# Patient Record
Sex: MALE | Race: OTHER | HISPANIC OR LATINO | URBAN - METROPOLITAN AREA
[De-identification: names, ages, dates, MRNs, and addresses within clinical notes are randomized per-mention and may not be internally consistent; named-entity substitution may affect disease eponyms.]

---

## 2018-01-21 ENCOUNTER — EMERGENCY (EMERGENCY)
Facility: HOSPITAL | Age: 43
LOS: 0 days | Discharge: HOME | End: 2018-01-21
Attending: EMERGENCY MEDICINE
Payer: SELF-PAY

## 2018-01-21 VITALS
SYSTOLIC BLOOD PRESSURE: 129 MMHG | HEART RATE: 74 BPM | DIASTOLIC BLOOD PRESSURE: 97 MMHG | TEMPERATURE: 99 F | RESPIRATION RATE: 18 BRPM | OXYGEN SATURATION: 99 %

## 2018-01-21 VITALS
RESPIRATION RATE: 18 BRPM | HEART RATE: 78 BPM | TEMPERATURE: 98 F | HEIGHT: 72 IN | DIASTOLIC BLOOD PRESSURE: 104 MMHG | OXYGEN SATURATION: 100 % | WEIGHT: 240.08 LBS | SYSTOLIC BLOOD PRESSURE: 158 MMHG

## 2018-01-21 DIAGNOSIS — N13.2 HYDRONEPHROSIS WITH RENAL AND URETERAL CALCULOUS OBSTRUCTION: ICD-10-CM

## 2018-01-21 DIAGNOSIS — R11.0 NAUSEA: ICD-10-CM

## 2018-01-21 DIAGNOSIS — R10.9 UNSPECIFIED ABDOMINAL PAIN: ICD-10-CM

## 2018-01-21 LAB
ALBUMIN SERPL ELPH-MCNC: 4 G/DL — SIGNIFICANT CHANGE UP (ref 3.3–5)
ALP SERPL-CCNC: 85 U/L — SIGNIFICANT CHANGE UP (ref 40–120)
ALT FLD-CCNC: 59 U/L — SIGNIFICANT CHANGE UP (ref 12–78)
ANION GAP SERPL CALC-SCNC: 10 MMOL/L — SIGNIFICANT CHANGE UP (ref 5–17)
APPEARANCE UR: CLEAR — SIGNIFICANT CHANGE UP
AST SERPL-CCNC: 30 U/L — SIGNIFICANT CHANGE UP (ref 15–37)
BASOPHILS # BLD AUTO: 0.1 K/UL — SIGNIFICANT CHANGE UP (ref 0–0.2)
BASOPHILS NFR BLD AUTO: 1 % — SIGNIFICANT CHANGE UP (ref 0–2)
BILIRUB SERPL-MCNC: 0.3 MG/DL — SIGNIFICANT CHANGE UP (ref 0.2–1.2)
BILIRUB UR-MCNC: NEGATIVE — SIGNIFICANT CHANGE UP
BUN SERPL-MCNC: 17 MG/DL — SIGNIFICANT CHANGE UP (ref 7–23)
CALCIUM SERPL-MCNC: 8.5 MG/DL — SIGNIFICANT CHANGE UP (ref 8.5–10.1)
CHLORIDE SERPL-SCNC: 108 MMOL/L — SIGNIFICANT CHANGE UP (ref 96–108)
CO2 SERPL-SCNC: 24 MMOL/L — SIGNIFICANT CHANGE UP (ref 22–31)
COLOR SPEC: YELLOW — SIGNIFICANT CHANGE UP
CREAT SERPL-MCNC: 1.11 MG/DL — SIGNIFICANT CHANGE UP (ref 0.5–1.3)
DIFF PNL FLD: NEGATIVE — SIGNIFICANT CHANGE UP
EOSINOPHIL # BLD AUTO: 0.2 K/UL — SIGNIFICANT CHANGE UP (ref 0–0.5)
EOSINOPHIL NFR BLD AUTO: 1.8 % — SIGNIFICANT CHANGE UP (ref 0–6)
GLUCOSE SERPL-MCNC: 112 MG/DL — HIGH (ref 70–99)
GLUCOSE UR QL: NEGATIVE MG/DL — SIGNIFICANT CHANGE UP
HCT VFR BLD CALC: 44.8 % — SIGNIFICANT CHANGE UP (ref 39–50)
HGB BLD-MCNC: 15.5 G/DL — SIGNIFICANT CHANGE UP (ref 13–17)
KETONES UR-MCNC: NEGATIVE — SIGNIFICANT CHANGE UP
LEUKOCYTE ESTERASE UR-ACNC: NEGATIVE — SIGNIFICANT CHANGE UP
LIDOCAIN IGE QN: 142 U/L — SIGNIFICANT CHANGE UP (ref 73–393)
LYMPHOCYTES # BLD AUTO: 38 % — SIGNIFICANT CHANGE UP (ref 13–44)
LYMPHOCYTES # BLD AUTO: 4 K/UL — HIGH (ref 1–3.3)
MCHC RBC-ENTMCNC: 31.9 PG — SIGNIFICANT CHANGE UP (ref 27–34)
MCHC RBC-ENTMCNC: 34.7 GM/DL — SIGNIFICANT CHANGE UP (ref 32–36)
MCV RBC AUTO: 92.1 FL — SIGNIFICANT CHANGE UP (ref 80–100)
MONOCYTES # BLD AUTO: 0.7 K/UL — SIGNIFICANT CHANGE UP (ref 0–0.9)
MONOCYTES NFR BLD AUTO: 7.1 % — SIGNIFICANT CHANGE UP (ref 2–14)
NEUTROPHILS # BLD AUTO: 5.4 K/UL — SIGNIFICANT CHANGE UP (ref 1.8–7.4)
NEUTROPHILS NFR BLD AUTO: 52 % — SIGNIFICANT CHANGE UP (ref 43–77)
NITRITE UR-MCNC: NEGATIVE — SIGNIFICANT CHANGE UP
PH UR: 6 — SIGNIFICANT CHANGE UP (ref 5–8)
PLATELET # BLD AUTO: 366 K/UL — SIGNIFICANT CHANGE UP (ref 150–400)
POTASSIUM SERPL-MCNC: 3.9 MMOL/L — SIGNIFICANT CHANGE UP (ref 3.5–5.3)
POTASSIUM SERPL-SCNC: 3.9 MMOL/L — SIGNIFICANT CHANGE UP (ref 3.5–5.3)
PROT SERPL-MCNC: 7.9 GM/DL — SIGNIFICANT CHANGE UP (ref 6–8.3)
PROT UR-MCNC: NEGATIVE MG/DL — SIGNIFICANT CHANGE UP
RBC # BLD: 4.87 M/UL — SIGNIFICANT CHANGE UP (ref 4.2–5.8)
RBC # FLD: 12.1 % — SIGNIFICANT CHANGE UP (ref 11–15)
SODIUM SERPL-SCNC: 142 MMOL/L — SIGNIFICANT CHANGE UP (ref 135–145)
SP GR SPEC: 1.01 — SIGNIFICANT CHANGE UP (ref 1.01–1.02)
UROBILINOGEN FLD QL: NEGATIVE MG/DL — SIGNIFICANT CHANGE UP
WBC # BLD: 10.5 K/UL — SIGNIFICANT CHANGE UP (ref 3.8–10.5)
WBC # FLD AUTO: 10.5 K/UL — SIGNIFICANT CHANGE UP (ref 3.8–10.5)

## 2018-01-21 PROCEDURE — 99285 EMERGENCY DEPT VISIT HI MDM: CPT | Mod: 25

## 2018-01-21 PROCEDURE — 74176 CT ABD & PELVIS W/O CONTRAST: CPT | Mod: 26

## 2018-01-21 PROCEDURE — 99053 MED SERV 10PM-8AM 24 HR FAC: CPT

## 2018-01-21 RX ORDER — KETOROLAC TROMETHAMINE 30 MG/ML
30 SYRINGE (ML) INJECTION ONCE
Qty: 0 | Refills: 0 | Status: DISCONTINUED | OUTPATIENT
Start: 2018-01-21 | End: 2018-01-21

## 2018-01-21 RX ORDER — MORPHINE SULFATE 50 MG/1
4 CAPSULE, EXTENDED RELEASE ORAL ONCE
Qty: 0 | Refills: 0 | Status: DISCONTINUED | OUTPATIENT
Start: 2018-01-21 | End: 2018-01-21

## 2018-01-21 RX ORDER — TAMSULOSIN HYDROCHLORIDE 0.4 MG/1
1 CAPSULE ORAL
Qty: 7 | Refills: 0
Start: 2018-01-21 | End: 2018-01-27

## 2018-01-21 RX ORDER — IBUPROFEN 200 MG
1 TABLET ORAL
Qty: 16 | Refills: 0
Start: 2018-01-21

## 2018-01-21 RX ORDER — ONDANSETRON 8 MG/1
4 TABLET, FILM COATED ORAL ONCE
Qty: 0 | Refills: 0 | Status: COMPLETED | OUTPATIENT
Start: 2018-01-21 | End: 2018-01-21

## 2018-01-21 RX ORDER — OXYCODONE AND ACETAMINOPHEN 5; 325 MG/1; MG/1
1 TABLET ORAL
Qty: 12 | Refills: 0
Start: 2018-01-21

## 2018-01-21 RX ORDER — SODIUM CHLORIDE 9 MG/ML
1000 INJECTION INTRAMUSCULAR; INTRAVENOUS; SUBCUTANEOUS ONCE
Qty: 0 | Refills: 0 | Status: COMPLETED | OUTPATIENT
Start: 2018-01-21 | End: 2018-01-21

## 2018-01-21 RX ORDER — OXYCODONE AND ACETAMINOPHEN 5; 325 MG/1; MG/1
1 TABLET ORAL ONCE
Qty: 0 | Refills: 0 | Status: DISCONTINUED | OUTPATIENT
Start: 2018-01-21 | End: 2018-01-21

## 2018-01-21 RX ADMIN — Medication 30 MILLIGRAM(S): at 08:00

## 2018-01-21 RX ADMIN — MORPHINE SULFATE 4 MILLIGRAM(S): 50 CAPSULE, EXTENDED RELEASE ORAL at 09:31

## 2018-01-21 RX ADMIN — MORPHINE SULFATE 4 MILLIGRAM(S): 50 CAPSULE, EXTENDED RELEASE ORAL at 09:28

## 2018-01-21 RX ADMIN — OXYCODONE AND ACETAMINOPHEN 1 TABLET(S): 5; 325 TABLET ORAL at 08:31

## 2018-01-21 RX ADMIN — SODIUM CHLORIDE 2000 MILLILITER(S): 9 INJECTION INTRAMUSCULAR; INTRAVENOUS; SUBCUTANEOUS at 07:22

## 2018-01-21 RX ADMIN — Medication 30 MILLIGRAM(S): at 07:22

## 2018-01-21 RX ADMIN — MORPHINE SULFATE 4 MILLIGRAM(S): 50 CAPSULE, EXTENDED RELEASE ORAL at 08:28

## 2018-01-21 RX ADMIN — MORPHINE SULFATE 4 MILLIGRAM(S): 50 CAPSULE, EXTENDED RELEASE ORAL at 10:21

## 2018-01-21 RX ADMIN — ONDANSETRON 4 MILLIGRAM(S): 8 TABLET, FILM COATED ORAL at 07:22

## 2018-01-21 RX ADMIN — OXYCODONE AND ACETAMINOPHEN 1 TABLET(S): 5; 325 TABLET ORAL at 08:07

## 2018-01-21 NOTE — ED PROVIDER NOTE - PROGRESS NOTE DETAILS
Aubrie: pt feeling better, given script for pain meds and instructions and given flomax, uro f/u and return precautions. stable for d/c. tolerating po

## 2018-01-21 NOTE — ED ADULT NURSE NOTE - CHPI ED SYMPTOMS NEG
no abdominal distension/no fever/no dysuria/no diarrhea/no chills/no burning urination/no hematuria/no vomiting/no blood in stool

## 2018-01-21 NOTE — ED PROVIDER NOTE - OBJECTIVE STATEMENT
41 y/o male no pmh c/o sudden onset left flank pain wrapping around front of left abd starting 1 hour ago associated with mild nausea. No vomiting, no hematuria, no frequency, no fever, cp, sob or diarrhea. Pt states ?mild dysuria. States had similar episode once before in past, not sure if he was told he had a kidney stone or not, it's possible.    ROS: No fever/chills. No eye pain/changes in vision, No ear pain/sore throat/dysphagia, No chest pain/palpitations. No SOB/cough/. No /V/D, no black/bloody bm. No frequency/discharge, No headache. No Dizziness.    No rashes or breaks in skin. No numbness/tingling/weakness.

## 2018-01-21 NOTE — ED ADULT NURSE NOTE - NS ED NURSE IV DC DT
Endorsed to LUPE Urrutia/ SEMAJ, libby/ox3, no distress noted, waiting for to transfer.
21-Jan-2018 11:15

## 2018-01-21 NOTE — ED PROVIDER NOTE - PHYSICAL EXAMINATION
Gen: writhing in pain.   HEENT: Mucous membranes moist, pink conjunctivae, EOMI  CV: RRR, nl s1/s2.  Resp: CTAB, normal rate and effort  GI: Abdomen soft, NT, ND. No rebound, no guarding  : No CVAT  Neuro: A&O x 3, moving all 4 extremities  MSK: No spine or joint tenderness to palpation  Skin: No rashes. intact and perfused.

## 2018-01-22 LAB
CULTURE RESULTS: NO GROWTH — SIGNIFICANT CHANGE UP
SPECIMEN SOURCE: SIGNIFICANT CHANGE UP

## 2021-04-20 NOTE — ED PROVIDER NOTE - CARE PLAN
Problem: Pain:  Goal: Pain level will decrease  Description: Pain level will decrease  Outcome: Ongoing  Goal: Control of acute pain  Description: Control of acute pain  Outcome: Ongoing  Goal: Control of chronic pain  Description: Control of chronic pain  Outcome: Ongoing     Problem: Nutrition  Goal: Optimal nutrition therapy  Outcome: Ongoing     Problem: Skin Integrity:  Goal: Will show no infection signs and symptoms  Description: Will show no infection signs and symptoms  Outcome: Ongoing  Goal: Absence of new skin breakdown  Description: Absence of new skin breakdown  Outcome: Ongoing     Problem: Suicide risk  Goal: Provide patient with safe environment  Description: Provide patient with safe environment  Outcome: Ongoing Principal Discharge DX:	Nephrolithiasis

## 2022-11-30 NOTE — ED ADULT NURSE NOTE - QUALITY
Please make an appointment to see the surgeon to discuss having your hernia repaired.  Stay on twice daily lansoprazole (prevacid) for now    Start a fiber supplement like citrucel or metamucil - start with once a day and gradually increase until you are taking it 3 times a day - this is to help with diarrhea.    I have also ordered you ondansetron (zofran) - take this every 8 hours for 2-3 days and then switch to as needed.    Please submit a stool sample.       throbbing/sharp/aching

## 2023-01-15 ENCOUNTER — INPATIENT (INPATIENT)
Facility: HOSPITAL | Age: 48
LOS: 1 days | Discharge: ANOTHER HOSPITAL | End: 2023-01-17
Attending: INTERNAL MEDICINE | Admitting: INTERNAL MEDICINE
Payer: MEDICAID

## 2023-01-15 VITALS
HEART RATE: 75 BPM | WEIGHT: 244.93 LBS | DIASTOLIC BLOOD PRESSURE: 103 MMHG | HEIGHT: 71 IN | OXYGEN SATURATION: 96 % | TEMPERATURE: 99 F | SYSTOLIC BLOOD PRESSURE: 164 MMHG | RESPIRATION RATE: 18 BRPM

## 2023-01-15 LAB
ALBUMIN SERPL ELPH-MCNC: 4.1 G/DL — SIGNIFICANT CHANGE UP (ref 3.3–5)
ALP SERPL-CCNC: 93 U/L — SIGNIFICANT CHANGE UP (ref 40–120)
ALT FLD-CCNC: 50 U/L — SIGNIFICANT CHANGE UP (ref 12–78)
ANION GAP SERPL CALC-SCNC: 9 MMOL/L — SIGNIFICANT CHANGE UP (ref 5–17)
APTT BLD: 35.9 SEC — HIGH (ref 27.5–35.5)
AST SERPL-CCNC: 25 U/L — SIGNIFICANT CHANGE UP (ref 15–37)
BASOPHILS # BLD AUTO: 0.04 K/UL — SIGNIFICANT CHANGE UP (ref 0–0.2)
BASOPHILS NFR BLD AUTO: 0.4 % — SIGNIFICANT CHANGE UP (ref 0–2)
BILIRUB SERPL-MCNC: 0.2 MG/DL — SIGNIFICANT CHANGE UP (ref 0.2–1.2)
BUN SERPL-MCNC: 20 MG/DL — SIGNIFICANT CHANGE UP (ref 7–23)
CALCIUM SERPL-MCNC: 9 MG/DL — SIGNIFICANT CHANGE UP (ref 8.5–10.1)
CHLORIDE SERPL-SCNC: 107 MMOL/L — SIGNIFICANT CHANGE UP (ref 96–108)
CK MB BLD-MCNC: 1.1 % — SIGNIFICANT CHANGE UP (ref 0–3.5)
CK MB CFR SERPL CALC: 3.9 NG/ML — HIGH (ref 0.5–3.6)
CK SERPL-CCNC: 366 U/L — HIGH (ref 26–308)
CO2 SERPL-SCNC: 26 MMOL/L — SIGNIFICANT CHANGE UP (ref 22–31)
CREAT SERPL-MCNC: 1.19 MG/DL — SIGNIFICANT CHANGE UP (ref 0.5–1.3)
EGFR: 76 ML/MIN/1.73M2 — SIGNIFICANT CHANGE UP
EOSINOPHIL # BLD AUTO: 0.22 K/UL — SIGNIFICANT CHANGE UP (ref 0–0.5)
EOSINOPHIL NFR BLD AUTO: 2.3 % — SIGNIFICANT CHANGE UP (ref 0–6)
FLUAV AG NPH QL: SIGNIFICANT CHANGE UP
FLUBV AG NPH QL: SIGNIFICANT CHANGE UP
GLUCOSE SERPL-MCNC: 108 MG/DL — HIGH (ref 70–99)
HCT VFR BLD CALC: 43.8 % — SIGNIFICANT CHANGE UP (ref 39–50)
HGB BLD-MCNC: 15.1 G/DL — SIGNIFICANT CHANGE UP (ref 13–17)
IMM GRANULOCYTES NFR BLD AUTO: 0.3 % — SIGNIFICANT CHANGE UP (ref 0–0.9)
INR BLD: 1 RATIO — SIGNIFICANT CHANGE UP (ref 0.88–1.16)
LYMPHOCYTES # BLD AUTO: 2.68 K/UL — SIGNIFICANT CHANGE UP (ref 1–3.3)
LYMPHOCYTES # BLD AUTO: 27.6 % — SIGNIFICANT CHANGE UP (ref 13–44)
MCHC RBC-ENTMCNC: 31.1 PG — SIGNIFICANT CHANGE UP (ref 27–34)
MCHC RBC-ENTMCNC: 34.5 G/DL — SIGNIFICANT CHANGE UP (ref 32–36)
MCV RBC AUTO: 90.3 FL — SIGNIFICANT CHANGE UP (ref 80–100)
MONOCYTES # BLD AUTO: 0.72 K/UL — SIGNIFICANT CHANGE UP (ref 0–0.9)
MONOCYTES NFR BLD AUTO: 7.4 % — SIGNIFICANT CHANGE UP (ref 2–14)
NEUTROPHILS # BLD AUTO: 6.01 K/UL — SIGNIFICANT CHANGE UP (ref 1.8–7.4)
NEUTROPHILS NFR BLD AUTO: 62 % — SIGNIFICANT CHANGE UP (ref 43–77)
NRBC # BLD: 0 /100 WBCS — SIGNIFICANT CHANGE UP (ref 0–0)
PLATELET # BLD AUTO: 348 K/UL — SIGNIFICANT CHANGE UP (ref 150–400)
POTASSIUM SERPL-MCNC: 3.8 MMOL/L — SIGNIFICANT CHANGE UP (ref 3.5–5.3)
POTASSIUM SERPL-SCNC: 3.8 MMOL/L — SIGNIFICANT CHANGE UP (ref 3.5–5.3)
PROT SERPL-MCNC: 8.1 GM/DL — SIGNIFICANT CHANGE UP (ref 6–8.3)
PROTHROM AB SERPL-ACNC: 12 SEC — SIGNIFICANT CHANGE UP (ref 10.5–13.4)
RBC # BLD: 4.85 M/UL — SIGNIFICANT CHANGE UP (ref 4.2–5.8)
RBC # FLD: 13.1 % — SIGNIFICANT CHANGE UP (ref 10.3–14.5)
SARS-COV-2 RNA SPEC QL NAA+PROBE: SIGNIFICANT CHANGE UP
SODIUM SERPL-SCNC: 142 MMOL/L — SIGNIFICANT CHANGE UP (ref 135–145)
TROPONIN I, HIGH SENSITIVITY RESULT: 17.1 NG/L — SIGNIFICANT CHANGE UP
WBC # BLD: 9.7 K/UL — SIGNIFICANT CHANGE UP (ref 3.8–10.5)
WBC # FLD AUTO: 9.7 K/UL — SIGNIFICANT CHANGE UP (ref 3.8–10.5)

## 2023-01-15 PROCEDURE — 99285 EMERGENCY DEPT VISIT HI MDM: CPT

## 2023-01-15 PROCEDURE — 71046 X-RAY EXAM CHEST 2 VIEWS: CPT | Mod: 26

## 2023-01-15 PROCEDURE — 99223 1ST HOSP IP/OBS HIGH 75: CPT

## 2023-01-15 RX ORDER — ATORVASTATIN CALCIUM 80 MG/1
80 TABLET, FILM COATED ORAL AT BEDTIME
Refills: 0 | Status: DISCONTINUED | OUTPATIENT
Start: 2023-01-15 | End: 2023-01-17

## 2023-01-15 RX ORDER — LANOLIN ALCOHOL/MO/W.PET/CERES
3 CREAM (GRAM) TOPICAL AT BEDTIME
Refills: 0 | Status: DISCONTINUED | OUTPATIENT
Start: 2023-01-15 | End: 2023-01-17

## 2023-01-15 RX ORDER — TAMSULOSIN HYDROCHLORIDE 0.4 MG/1
0.4 CAPSULE ORAL AT BEDTIME
Refills: 0 | Status: DISCONTINUED | OUTPATIENT
Start: 2023-01-15 | End: 2023-01-17

## 2023-01-15 RX ORDER — ASPIRIN/CALCIUM CARB/MAGNESIUM 324 MG
324 TABLET ORAL ONCE
Refills: 0 | Status: COMPLETED | OUTPATIENT
Start: 2023-01-15 | End: 2023-01-15

## 2023-01-15 RX ADMIN — ATORVASTATIN CALCIUM 80 MILLIGRAM(S): 80 TABLET, FILM COATED ORAL at 23:26

## 2023-01-15 RX ADMIN — Medication 324 MILLIGRAM(S): at 18:52

## 2023-01-15 NOTE — H&P ADULT - NSHPPHYSICALEXAM_GEN_ALL_CORE
T(C): 37 (01-15-23 @ 19:25), Max: 37.1 (01-15-23 @ 17:30)  HR: 64 (01-15-23 @ 21:53) (64 - 75)  BP: 146/78 (01-15-23 @ 21:53) (142/85 - 164/103)  RR: 17 (01-15-23 @ 21:53) (17 - 18)  SpO2: 97% (01-15-23 @ 21:53) (96% - 97%)    CONSTITUTIONAL: Well groomed, no apparent distress  EYES: PERRLA and symmetric, EOMI, No conjunctival or scleral injection, non-icteric  ENMT: Oral mucosa with moist membranes. Normal dentition; no pharyngeal injection or exudates  NECK: Supple, symmetric and without tracheal deviation   RESP: No respiratory distress, no use of accessory muscles; CTA b/l, no WRR  CV: RRR, +S1S2, no MRG; no JVD; no peripheral edema  GI: Soft, NT, ND, no rebound, no guarding  LYMPH: No cervical LAD or tenderness; no axillary LAD or tenderness; no inguinal LAD or tenderness  MSK:Normal ROM without pain, no spinal tenderness, normal muscle strength/tone  SKIN: No rashes or ulcers noted; no subcutaneous nodules or induration palpable  NEURO: CN II-XII intact; sensation intact in upper and lower extremities b/l to light touch   PSYCH: Appropriate insight/judgment; A+O x 3, mood and affect appropriate, recent/remote memory intact

## 2023-01-15 NOTE — ED PROVIDER NOTE - NS ED ATTENDING STATEMENT MOD
This was a shared visit with the BRUNILDA. I reviewed and verified the documentation and independently performed the documented:

## 2023-01-15 NOTE — ED ADULT NURSE NOTE - NSIMPLEMENTINTERV_GEN_ALL_ED
-Keep blanket away from the baby's face.
Implemented All Universal Safety Interventions:  [place] to call system. Call bell, personal items and telephone within reach. Instruct patient to call for assistance. Room bathroom lighting operational. Non-slip footwear when patient is off stretcher. Physically safe environment: no spills, clutter or unnecessary equipment. Stretcher in lowest position, wheels locked, appropriate side rails in place.

## 2023-01-15 NOTE — H&P ADULT - NSHPLABSRESULTS_GEN_ALL_CORE
15.1   9.70  )-----------( 348      ( 15 All 2023 18:49 )             43.8       01-15    142  |  107  |  20  ----------------------------<  108<H>  3.8   |  26  |  1.19    Ca    9.0      15 All 2023 18:49    TPro  8.1  /  Alb  4.1  /  TBili  0.2  /  DBili  x   /  AST  25  /  ALT  50  /  AlkPhos  93  01-15              PT/INR - ( 15 All 2023 18:49 )   PT: 12.0 sec;   INR: 1.00 ratio         PTT - ( 15 All 2023 18:49 )  PTT:35.9 sec

## 2023-01-15 NOTE — ED PROVIDER NOTE - OBJECTIVE STATEMENT
47M denies PMHx, does not have routine follow-up, is here with intermittent chest pain x 3 days, reports pain is exertional in nature. Denies associated shortness of breath, cough, swelling, fever, chills. Denies pleuritic pain. Pain is sternal, described as tightness, no associated nausea vomiting or diaphoresis.

## 2023-01-15 NOTE — H&P ADULT - ASSESSMENT
#Unstable Angina  - Patient reports multiple #Stable Angina  - Patient reports multiple bouts of HOLM relieved with rest  - EKG: NSR  - Troponin negative  - F/u TTE  - Start aspirin, atorvastatin  - Consider cardiology consult  - Patient would benefit from stress test and possible outpatient catherization  - Admit to Obs for tele monitoring    Code: Full

## 2023-01-15 NOTE — ED PROVIDER NOTE - CLINICAL SUMMARY MEDICAL DECISION MAKING FREE TEXT BOX
Patient presents with chest pain, exertional in nature, concern for unstable angina vs ACS. CBC and CMP without evidence of profound anemia, electrolyte imbalance or infection, troponin is normal, no acute ecg changes, recommend admission to OBS for cardiac testing

## 2023-01-15 NOTE — ED ADULT TRIAGE NOTE - NSSEPSISSUSPECTED_ED_A_ED
[facility]  History & Physical    Patient Name: Ángel Curtis  MRN: [id number]  Patient Class: IP- Inpatient  Admission Date: 9/23/2022  Attending Physician: Ángel Leo MD   Primary Care Provider: Primary Doctor No         Patient information was obtained from patient and ER records.     Subjective:     Principal Problem:Closed 2-part intertrochanteric fracture of proximal end of left femur, initial encounter    Chief Complaint:   Chief Complaint   Patient presents with    Fall     Mechanical fall on slippery floor resulting in right hip injury. + shortening, rotation, and displacement.          HPI: 71 y/o M w/pmhx of MI (Feb '22, 3 stents, 2 other MI in past), ICD (2007), CHF, HTN, COPD.   The patient lives at home with his wife. He suffered a mechanical fall on his way to the bathroom at 2200 on the night before presentation to Emergency. He fell to his left, denies LOC, palpitations, chest pain, SOB, fevers, head trauma. He complains of pain to his left hip. He denies other trauma. He notes a recent mild cough, which he attributes to smoking. The patient has been prescribed several medications for his background conditions, but states he has taken no medications at all for approximately one month. He smokes pack/day regularly and drinks approximately 5 shots of vodka per day. He denies illicit drug use.    ED course, 124/94, , 96.9F, SpO2 98% on 3lpm. No significant lab findings. CXR shows bilateral edema. An xray of the left hip showed an impacted varus angulated left femoral neck fracture. He was admitted to the Family Medicine in-patient service for medical management before and after orthopedic surgery.      No past medical history on file.    No past surgical history on file.    Review of patient's allergies indicates:  No Known Allergies    No current facility-administered medications on file prior to encounter.     Current Outpatient Medications on File Prior to  Encounter   Medication Sig    albuterol (PROVENTIL/VENTOLIN HFA) 90 mcg/actuation inhaler Inhale 2 puffs into the lungs every 6 (six) hours as needed.    cetirizine (ZYRTEC) 10 MG tablet Take 10 mg by mouth once daily.    fluticasone propionate (FLONASE) 50 mcg/actuation nasal spray 2 sprays by Each Nostril route once daily.    furosemide (LASIX) 20 MG tablet Take 20 mg by mouth 2 (two) times daily.    losartan (COZAAR) 25 MG tablet Take 25 mg by mouth once daily.    metoprolol succinate (TOPROL-XL) 200 MG 24 hr tablet Take 200 mg by mouth once daily.    pravastatin (PRAVACHOL) 40 MG tablet Take 40 mg by mouth once daily.    spironolactone (ALDACTONE) 25 MG tablet Take 25 mg by mouth once daily.    SYMBICORT 160-4.5 mcg/actuation HFAA Inhale 2 puffs into the lungs 2 (two) times a day.    aspirin (ECOTRIN) 81 MG EC tablet Take 81 mg by mouth once daily.    buPROPion HCL, smoking deter, (ZYBAN) 150 mg TBSR 12 hr tablet Take 150 mg by mouth 2 (two) times a day.    nicotine (NICODERM CQ) 21 mg/24 hr SMARTSIG:Patch(s) Topical Daily    nicotine polacrilex 2 MG Lozg SMARTSI Lozenge(s) By Mouth Every 2 Hours PRN    [DISCONTINUED] methylPREDNISolone (MEDROL DOSEPACK) 4 mg tablet use as directed     Family History    None       Tobacco Use    Smoking status: Not on file    Smokeless tobacco: Not on file   Substance and Sexual Activity    Alcohol use: Not on file    Drug use: Not on file    Sexual activity: Not on file     Review of Systems   Constitutional:  Negative for fatigue and fever.   Respiratory:  Positive for cough. Negative for chest tightness and shortness of breath.    Cardiovascular:  Negative for chest pain, palpitations and leg swelling.   Gastrointestinal:  Negative for abdominal pain, constipation, diarrhea, nausea and vomiting.   Musculoskeletal:  Positive for arthralgias.   Neurological:  Negative for dizziness, syncope, weakness, light-headedness, numbness and headaches.   Objective:      Vital Signs (Most Recent):  Temp: 97.5 °F (36.4 °C) (09/23/22 1337)  Pulse: 86 (09/23/22 1337)  Resp: 20 (09/23/22 1337)  BP: (!) 150/85 (09/23/22 1337)  SpO2: 97 % (09/23/22 1337)   Vital Signs (24h Range):  Temp:  [96.9 °F (36.1 °C)-97.5 °F (36.4 °C)] 97.5 °F (36.4 °C)  Pulse:  [] 86  Resp:  [16-20] 20  SpO2:  [96 %-98 %] 97 %  BP: (124-150)/() 150/85     Weight: 80.7 kg (178 lb)  Body mass index is 25.54 kg/m².    Physical Exam  Constitutional:       Appearance: Normal appearance.   HENT:      Head: Normocephalic.      Mouth/Throat:      Mouth: Mucous membranes are moist.   Eyes:      Extraocular Movements: Extraocular movements intact.      Pupils: Pupils are equal, round, and reactive to light.   Cardiovascular:      Rate and Rhythm: Rhythm irregular.      Pulses: Normal pulses.      Heart sounds: Normal heart sounds.   Pulmonary:      Effort: Pulmonary effort is normal.      Breath sounds: Rhonchi and rales present.   Abdominal:      General: Bowel sounds are normal. There is distension.      Tenderness: There is no abdominal tenderness. There is no guarding.   Musculoskeletal:         General: Deformity present.      Cervical back: Normal range of motion and neck supple. No tenderness.      Comments: Left leg retracted and externally rotated  Left hip tender  ROM limited by pain  No hematoma   Skin:     Findings: No bruising.   Neurological:      General: No focal deficit present.      Mental Status: He is alert and oriented to person, place, and time.      Cranial Nerves: No cranial nerve deficit.      Sensory: No sensory deficit.      Comments: Strength 5/5 in all limbs except left leg... limited by pain  SILT  Pulses strong, irregular in all four limbs         CRANIAL NERVES     CN III, IV, VI   Pupils are equal, round, and reactive to light.     Significant Labs: All pertinent labs within the past 24 hours have been reviewed.  CBC:   Recent Labs   Lab 09/23/22  0848   WBC 9.40   HGB 15.7    HCT 47.7   PLT 93*     CMP:   Recent Labs   Lab 09/23/22  0937      K 3.7      CO2 27      BUN 9   CREATININE 0.8   CALCIUM 8.6*   ANIONGAP 11       Significant Imaging: I have reviewed all pertinent imaging results/findings within the past 24 hours.    Assessment/Plan:     * Closed 2-part intertrochanteric fracture of proximal end of left femur, initial encounter  Mechanical fall 9/22 approx 2200, fell to Left side, denies LOC or head trauma  Evidence of fx on xray  Ortho has evaluated patient    PLAN:  - NPO  - Cards clearance consult  - surgery, per ortho  - PT/OT      AICD (automatic cardioverter/defibrillator) present  Cards to evaluate    PLAN:  - f/u recs      CHF (congestive heart failure)  Prior diagnosis in Care Everywhere. Endorses recent cough, sleeping sitting up. Currently SpO2 in mid90s on 2lpm. No signs of overload on exam.  Prescribed Lasix 20 at home, does not take     PLAN:  - Echo  - Cards recs  - Diurese prn        CAD (coronary artery disease)  S/p 3 stents after MI in Feb '22, s/p ICD 2007  States nonadherance to any medication    PLAN:  - Cardiology consult, f/u recs  - restart prescribed meds (after surgery)  - Echo, f/u results          VTE Risk Mitigation (From admission, onward)         Ordered     IP VTE HIGH RISK PATIENT  Once         09/23/22 1120                   Ha Landa MD  Department of Hospital Medicine   [facility]   No

## 2023-01-15 NOTE — H&P ADULT - HISTORY OF PRESENT ILLNESS
46 yo M presents with chest pain on exertion since friday. Patient has had multiple bouts of chest pain since friday and wanted to get assessed for his chest pain. Reports pain in substernal, pressure like and radiates to L arm at times. Chest pain is not associated with diaphoresis, nausea, or SOB and is always relieved with rest. Father passed away due to MI at 68. Denies smoking or any medical history. At present patient is asymptomatic at rest in bed. Pt does not have cardiologist.

## 2023-01-15 NOTE — ED ADULT NURSE NOTE - ED STAT RN HANDOFF DETAILS
Report given to receiving RN Ruth Allen, pts history, current condition and reason for  ED admission observation discussed, safety concerns addressed and reviewed, pt currently in stable condition, IV flushes for patency and site shows no signs or symptoms of infiltrate, dressing is clean dry and intact, pt is aware of plan of care. Pt education deemed successful at time of report after patient demonstrates successful teach back for proficiency.

## 2023-01-16 DIAGNOSIS — I25.118 ATHEROSCLEROTIC HEART DISEASE OF NATIVE CORONARY ARTERY WITH OTHER FORMS OF ANGINA PECTORIS: ICD-10-CM

## 2023-01-16 LAB
A1C WITH ESTIMATED AVERAGE GLUCOSE RESULT: 5.7 % — HIGH (ref 4–5.6)
ALBUMIN SERPL ELPH-MCNC: 3.8 G/DL — SIGNIFICANT CHANGE UP (ref 3.3–5)
ALP SERPL-CCNC: 84 U/L — SIGNIFICANT CHANGE UP (ref 40–120)
ALT FLD-CCNC: 46 U/L — SIGNIFICANT CHANGE UP (ref 12–78)
ANION GAP SERPL CALC-SCNC: 8 MMOL/L — SIGNIFICANT CHANGE UP (ref 5–17)
AST SERPL-CCNC: 21 U/L — SIGNIFICANT CHANGE UP (ref 15–37)
BILIRUB SERPL-MCNC: 0.6 MG/DL — SIGNIFICANT CHANGE UP (ref 0.2–1.2)
BUN SERPL-MCNC: 20 MG/DL — SIGNIFICANT CHANGE UP (ref 7–23)
CALCIUM SERPL-MCNC: 9 MG/DL — SIGNIFICANT CHANGE UP (ref 8.5–10.1)
CHLORIDE SERPL-SCNC: 109 MMOL/L — HIGH (ref 96–108)
CHOLEST SERPL-MCNC: 175 MG/DL — SIGNIFICANT CHANGE UP
CO2 SERPL-SCNC: 23 MMOL/L — SIGNIFICANT CHANGE UP (ref 22–31)
CREAT SERPL-MCNC: 0.98 MG/DL — SIGNIFICANT CHANGE UP (ref 0.5–1.3)
EGFR: 96 ML/MIN/1.73M2 — SIGNIFICANT CHANGE UP
ESTIMATED AVERAGE GLUCOSE: 117 MG/DL — HIGH (ref 68–114)
GLUCOSE SERPL-MCNC: 101 MG/DL — HIGH (ref 70–99)
HCT VFR BLD CALC: 44.9 % — SIGNIFICANT CHANGE UP (ref 39–50)
HDLC SERPL-MCNC: 39 MG/DL — LOW
HGB BLD-MCNC: 15 G/DL — SIGNIFICANT CHANGE UP (ref 13–17)
LIPID PNL WITH DIRECT LDL SERPL: 109 MG/DL — HIGH
MCHC RBC-ENTMCNC: 30.1 PG — SIGNIFICANT CHANGE UP (ref 27–34)
MCHC RBC-ENTMCNC: 33.4 G/DL — SIGNIFICANT CHANGE UP (ref 32–36)
MCV RBC AUTO: 90 FL — SIGNIFICANT CHANGE UP (ref 80–100)
NON HDL CHOLESTEROL: 136 MG/DL — HIGH
NRBC # BLD: 0 /100 WBCS — SIGNIFICANT CHANGE UP (ref 0–0)
PLATELET # BLD AUTO: 323 K/UL — SIGNIFICANT CHANGE UP (ref 150–400)
POTASSIUM SERPL-MCNC: 3.7 MMOL/L — SIGNIFICANT CHANGE UP (ref 3.5–5.3)
POTASSIUM SERPL-SCNC: 3.7 MMOL/L — SIGNIFICANT CHANGE UP (ref 3.5–5.3)
PROT SERPL-MCNC: 7.5 GM/DL — SIGNIFICANT CHANGE UP (ref 6–8.3)
RBC # BLD: 4.99 M/UL — SIGNIFICANT CHANGE UP (ref 4.2–5.8)
RBC # FLD: 13.2 % — SIGNIFICANT CHANGE UP (ref 10.3–14.5)
SODIUM SERPL-SCNC: 140 MMOL/L — SIGNIFICANT CHANGE UP (ref 135–145)
TRIGL SERPL-MCNC: 136 MG/DL — SIGNIFICANT CHANGE UP
TROPONIN I, HIGH SENSITIVITY RESULT: 9.4 NG/L — SIGNIFICANT CHANGE UP
WBC # BLD: 8.81 K/UL — SIGNIFICANT CHANGE UP (ref 3.8–10.5)
WBC # FLD AUTO: 8.81 K/UL — SIGNIFICANT CHANGE UP (ref 3.8–10.5)

## 2023-01-16 PROCEDURE — 99233 SBSQ HOSP IP/OBS HIGH 50: CPT

## 2023-01-16 PROCEDURE — 99223 1ST HOSP IP/OBS HIGH 75: CPT

## 2023-01-16 RX ADMIN — ATORVASTATIN CALCIUM 80 MILLIGRAM(S): 80 TABLET, FILM COATED ORAL at 22:40

## 2023-01-16 RX ADMIN — TAMSULOSIN HYDROCHLORIDE 0.4 MILLIGRAM(S): 0.4 CAPSULE ORAL at 22:40

## 2023-01-16 NOTE — CONSULT NOTE ADULT - ASSESSMENT
47M preDM with exertional chest pain.    ECG not available for review  Initial troponin normal.    Given cardiac risk factors and new onset chest pain, will check a plain treadmill stress test for further risk stratification (ordered as nuclear but no radioisotope is to be administered)

## 2023-01-16 NOTE — CONSULT NOTE ADULT - SUBJECTIVE AND OBJECTIVE BOX
Cardiology Initial Consult    [facility] - Cardiology at [place]  [address], [place] [address]  Office: [phone]  Fax: [phone]    CHIEF COMPLAINT:      HISTORY OF PRESENT ILLNESS:  Primary cardiologist:  Herbert    Allergies    No Known Allergies    Intolerances    	    MEDICATIONS:        melatonin 3 milliGRAM(s) Oral at bedtime PRN      atorvastatin 80 milliGRAM(s) Oral at bedtime    tamsulosin 0.4 milliGRAM(s) Oral at bedtime      PAST MEDICAL & SURGICAL HISTORY:  No pertinent past medical history          FAMILY HISTORY:      SOCIAL HISTORY:    [ ] Non-smoker  [ ] Smoker  [ ] Alcohol    Review of Systems:  Constitutional: [ ] Fever [ ] Chills [ ] Fatigue [ ] Weight change   HEENT: [ ] Blurred vision [ ] Eye pain [ ] Headache [ ] Runny nose [ ] Sore throat   Respiratory: [ ] Cough [ ] Wheezing [ ] Shortness of breath  Cardiovascular: [ ] Chest Pain [ ] Palpitations [ ] HOLM [ ] PND [ ] Orthopnea  Gastrointestinal: [ ] Abdominal Pain [ ] Diarrhea [ ] Constipation [ ] Hemorrhoids [ ] Nausea [ ] Vomiting  Genitourinary: [ ] Nocturia [ ] Dysuria [ ] Incontinence  Extremities: [ ] Swelling [ ] Joint Pain  Neurologic: [ ] Focal deficit [ ] Paresthesias [ ] Syncope  Skin: [ ] Rash [ ] Ecchymoses [ ] Wounds [ ] Lesions  Psychiatry: [ ] Depression [ ] Suicidal/Homicidal ideation [ ] Anxiety [ ] Sleep disturbances  [ ] 10 point review of systems is otherwise negative except as mentioned above            [ ]Unable to obtain    PHYSICAL EXAM:  T(C): 36.7 (01-16-23 @ 07:53), Max: 37.1 (01-15-23 @ 17:30)  HR: 57 (01-16-23 @ 07:53) (57 - 75)  BP: 124/80 (01-16-23 @ 07:53) (124/80 - 164/103)  RR: 13 (01-16-23 @ 07:53) (13 - 18)  SpO2: 94% (01-16-23 @ 07:53) (94% - 97%)  Wt(kg): --  I&O's Summary      Appearance: No acute distress  HEENT:   Normal oral mucosa, PERRL  Cardiovascular: Normal S1 S2, no elevated JVP, no murmurs, no edema  Respiratory: Lungs clear to auscultation	, good air movement  Psychiatry: A & O x 3, Mood & affect appropriate  Gastrointestinal:  soft nt nd normoactive bs	  Skin: No rashes, no ecchymoses, no cyanosis	  Neurologic: grossly non-focal  Extremities: Normal range of motion, no clubbing, cyanosis or edema  Vascular: Peripheral pulses palpable bilaterally    LABS:	 	  CBC Full  -  ( 16 Jan 2023 07:40 )  WBC Count : 8.81 K/uL  Hemoglobin : 15.0 g/dL  Hematocrit : 44.9 %  Platelet Count - Automated : 323 K/uL  Mean Cell Volume : 90.0 fl  Mean Cell Hemoglobin : 30.1 pg  Mean Cell Hemoglobin Concentration : 33.4 g/dL  Auto Neutrophil # : x  Auto Lymphocyte # : x  Auto Monocyte # : x  Auto Eosinophil # : x  Auto Basophil # : x  Auto Neutrophil % : x  Auto Lymphocyte % : x  Auto Monocyte % : x  Auto Eosinophil % : x  Auto Basophil % : x    01-16    140  |  109<H>  |  20  ----------------------------<  101<H>  3.7   |  23  |  0.98  01-15    142  |  107  |  20  ----------------------------<  108<H>  3.8   |  26  |  1.19    Ca    9.0      16 Jan 2023 07:40  Ca    9.0      15 All 2023 18:49    TPro  7.5  /  Alb  3.8  /  TBili  0.6  /  DBili  x   /  AST  21  /  ALT  46  /  AlkPhos  84  01-16  TPro  8.1  /  Alb  4.1  /  TBili  0.2  /  DBili  x   /  AST  25  /  ALT  50  /  AlkPhos  93  01-15      proBNP:   Lipid Profile:   HgA1c:   TSH:     CARDIAC MARKERS:            Troponin I, High Sensitivity Result: 17.1 ng/L (01-15-23 @ 18:49)      TELEMETRY: 	    ECG:  	  RADIOLOGY:  OTHER: 	    PREVIOUS DIAGNOSTIC TESTING:    [ ] Echocardiogram:   [ ] Catheterization:  [ ] Stress Test:  	 Cardiology Initial Consult    [facility] - Cardiology at [place]  [address], [place] [address]  Office: [phone]  Fax: [phone]    CHIEF COMPLAINT:  chest pain    HISTORY OF PRESENT ILLNESS:  47M preDM who presents with exertional chest pain/pressure for the 2-3 days prior to presentation. Does radiate to the L arm and associated with little to no SOB. Always relieved with rest. Has not significantly exerted himself since symptom onset.    Quit smoking 10+ years ago. No sympathomimetic use other than drinking [employer] energy drinks.    No fam hx of premature CAD or SCD.  Father  from MI age 68      Allergies  No Known Allergies  Intolerances    MEDICATIONS:  melatonin 3 milliGRAM(s) Oral at bedtime PRN  atorvastatin 80 milliGRAM(s) Oral at bedtime  tamsulosin 0.4 milliGRAM(s) Oral at bedtime    PAST MEDICAL & SURGICAL HISTORY:  No pertinent past medical history    Review of Systems:  Constitutional: [- ] Fever [ -] Chills [ ] Fatigue [ ] Weight change   HEENT: [ ] Blurred vision [ ] Eye pain [ -] Headache [ ] Runny nose [ ] Sore throat   Respiratory: [- ] Cough [ ] Wheezing [ ] Shortness of breath  Cardiovascular: [x ] Chest Pain [ -] Palpitations [- ] HOLM [- ] PND [- ] Orthopnea  Gastrointestinal: [- ] Abdominal Pain [ ] Diarrhea [ ] Constipation [ ] Hemorrhoids [ ] Nausea [ ] Vomiting  Genitourinary: [ ] Nocturia [- ] Dysuria [ ] Incontinence  Extremities: [- ] Swelling [ ] Joint Pain  Neurologic: [ ] Focal deficit [ ] Paresthesias [ -] Syncope  Skin: [- ] Rash [ ] Ecchymoses [ ] Wounds [ ] Lesions  Psychiatry: [ -] Depression [ ] Suicidal/Homicidal ideation [ ] Anxiety [ ] Sleep disturbances  [x ] 10 point review of systems is otherwise negative except as mentioned above            [ ]Unable to obtain    PHYSICAL EXAM:  T(C): 36.7 (23 @ 07:53), Max: 37.1 (01-15-23 @ 17:30)  HR: 57 (23 @ 07:53) (57 - 75)  BP: 124/80 (23 @ 07:53) (124/80 - 164/103)  RR: 13 (23 @ 07:53) (13 - 18)  SpO2: 94% (23 @ 07:53) (94% - 97%)  Wt(kg): --  I&O's Summary      Appearance: No acute distress  HEENT:   Normal oral mucosa, PERRL  Cardiovascular: Normal S1 S2, no elevated JVP, no murmurs, no edema  Respiratory: Lungs clear to auscultation	, good air movement  Psychiatry: A & O x 3, Mood & affect appropriate  Gastrointestinal:  soft nt  Skin: No rashes, no ecchymoses, no cyanosis	  Neurologic: grossly non-focal  Extremities: Normal range of motion, no clubbing, cyanosis or edema  Vascular: Peripheral pulses palpable bilaterally    LABS:	 	  CBC Full  -  ( 2023 07:40 )  WBC Count : 8.81 K/uL  Hemoglobin : 15.0 g/dL  Hematocrit : 44.9 %  Platelet Count - Automated : 323 K/uL      140  |  109<H>  |  20  ----------------------------<  101<H>  3.7   |  23  |  0.98    01-15  142  |  107  |  20  ----------------------------<  108<H>  3.8   |  26  |  1.19    Ca    9.0      2023 07:40  Ca    9.0      15 All 2023 18:49    TPro  7.5  /  Alb  3.8  /  TBili  0.6  /  DBili  x   /  AST  21  /  ALT  46  /  AlkPhos  84    TPro  8.1  /  Alb  4.1  /  TBili  0.2  /  DBili  x   /  AST  25  /  ALT  50  /  AlkPhos  93  01-15    proBNP:   Lipid Profile:   HgA1c:   TSH:     CARDIAC MARKERS:  Troponin I, High Sensitivity Result: 17.1 ng/L (01-15-23 @ 18:49)    ECG:  	not in chart  RADIOLOGY:  OTHER: 	    PREVIOUS DIAGNOSTIC TESTING:    [ ] Echocardiogram:   [ ] Catheterization:  [ ] Stress Test:

## 2023-01-17 ENCOUNTER — TRANSCRIPTION ENCOUNTER (OUTPATIENT)
Age: 48
End: 2023-01-17

## 2023-01-17 ENCOUNTER — INPATIENT (INPATIENT)
Facility: HOSPITAL | Age: 48
LOS: 0 days | Discharge: HOME | DRG: 247 | End: 2023-01-18
Attending: INTERNAL MEDICINE | Admitting: INTERNAL MEDICINE
Payer: MEDICAID

## 2023-01-17 VITALS
TEMPERATURE: 98 F | HEART RATE: 79 BPM | DIASTOLIC BLOOD PRESSURE: 90 MMHG | OXYGEN SATURATION: 97 % | RESPIRATION RATE: 16 BRPM | WEIGHT: 244.93 LBS | HEIGHT: 71 IN | SYSTOLIC BLOOD PRESSURE: 145 MMHG

## 2023-01-17 VITALS
SYSTOLIC BLOOD PRESSURE: 125 MMHG | RESPIRATION RATE: 18 BRPM | HEART RATE: 70 BPM | TEMPERATURE: 98 F | OXYGEN SATURATION: 95 % | DIASTOLIC BLOOD PRESSURE: 87 MMHG

## 2023-01-17 DIAGNOSIS — I20.9 ANGINA PECTORIS, UNSPECIFIED: ICD-10-CM

## 2023-01-17 PROBLEM — Z78.9 OTHER SPECIFIED HEALTH STATUS: Chronic | Status: ACTIVE | Noted: 2023-01-16

## 2023-01-17 LAB
ALBUMIN SERPL ELPH-MCNC: 4 G/DL — SIGNIFICANT CHANGE UP (ref 3.3–5)
ALP SERPL-CCNC: 90 U/L — SIGNIFICANT CHANGE UP (ref 40–120)
ALT FLD-CCNC: 49 U/L — SIGNIFICANT CHANGE UP (ref 12–78)
ANION GAP SERPL CALC-SCNC: 9 MMOL/L — SIGNIFICANT CHANGE UP (ref 5–17)
AST SERPL-CCNC: 25 U/L — SIGNIFICANT CHANGE UP (ref 15–37)
BASOPHILS # BLD AUTO: 0.05 K/UL — SIGNIFICANT CHANGE UP (ref 0–0.2)
BASOPHILS NFR BLD AUTO: 0.5 % — SIGNIFICANT CHANGE UP (ref 0–2)
BILIRUB SERPL-MCNC: 0.6 MG/DL — SIGNIFICANT CHANGE UP (ref 0.2–1.2)
BUN SERPL-MCNC: 20 MG/DL — SIGNIFICANT CHANGE UP (ref 7–23)
CALCIUM SERPL-MCNC: 9.2 MG/DL — SIGNIFICANT CHANGE UP (ref 8.5–10.1)
CHLORIDE SERPL-SCNC: 107 MMOL/L — SIGNIFICANT CHANGE UP (ref 96–108)
CO2 SERPL-SCNC: 23 MMOL/L — SIGNIFICANT CHANGE UP (ref 22–31)
CREAT SERPL-MCNC: 1.06 MG/DL — SIGNIFICANT CHANGE UP (ref 0.5–1.3)
EGFR: 87 ML/MIN/1.73M2 — SIGNIFICANT CHANGE UP
EOSINOPHIL # BLD AUTO: 0.17 K/UL — SIGNIFICANT CHANGE UP (ref 0–0.5)
EOSINOPHIL NFR BLD AUTO: 1.8 % — SIGNIFICANT CHANGE UP (ref 0–6)
GLUCOSE SERPL-MCNC: 107 MG/DL — HIGH (ref 70–99)
HCT VFR BLD CALC: 46 % — SIGNIFICANT CHANGE UP (ref 39–50)
HGB BLD-MCNC: 15.6 G/DL — SIGNIFICANT CHANGE UP (ref 13–17)
IMM GRANULOCYTES NFR BLD AUTO: 0.4 % — SIGNIFICANT CHANGE UP (ref 0–0.9)
LYMPHOCYTES # BLD AUTO: 2.25 K/UL — SIGNIFICANT CHANGE UP (ref 1–3.3)
LYMPHOCYTES # BLD AUTO: 23.9 % — SIGNIFICANT CHANGE UP (ref 13–44)
MCHC RBC-ENTMCNC: 30 PG — SIGNIFICANT CHANGE UP (ref 27–34)
MCHC RBC-ENTMCNC: 33.9 G/DL — SIGNIFICANT CHANGE UP (ref 32–36)
MCV RBC AUTO: 88.5 FL — SIGNIFICANT CHANGE UP (ref 80–100)
MONOCYTES # BLD AUTO: 0.67 K/UL — SIGNIFICANT CHANGE UP (ref 0–0.9)
MONOCYTES NFR BLD AUTO: 7.1 % — SIGNIFICANT CHANGE UP (ref 2–14)
NEUTROPHILS # BLD AUTO: 6.24 K/UL — SIGNIFICANT CHANGE UP (ref 1.8–7.4)
NEUTROPHILS NFR BLD AUTO: 66.3 % — SIGNIFICANT CHANGE UP (ref 43–77)
NRBC # BLD: 0 /100 WBCS — SIGNIFICANT CHANGE UP (ref 0–0)
PLATELET # BLD AUTO: 361 K/UL — SIGNIFICANT CHANGE UP (ref 150–400)
POTASSIUM SERPL-MCNC: 3.6 MMOL/L — SIGNIFICANT CHANGE UP (ref 3.5–5.3)
POTASSIUM SERPL-SCNC: 3.6 MMOL/L — SIGNIFICANT CHANGE UP (ref 3.5–5.3)
PROT SERPL-MCNC: 7.8 GM/DL — SIGNIFICANT CHANGE UP (ref 6–8.3)
RBC # BLD: 5.2 M/UL — SIGNIFICANT CHANGE UP (ref 4.2–5.8)
RBC # FLD: 12.9 % — SIGNIFICANT CHANGE UP (ref 10.3–14.5)
SODIUM SERPL-SCNC: 139 MMOL/L — SIGNIFICANT CHANGE UP (ref 135–145)
WBC # BLD: 9.42 K/UL — SIGNIFICANT CHANGE UP (ref 3.8–10.5)
WBC # FLD AUTO: 9.42 K/UL — SIGNIFICANT CHANGE UP (ref 3.8–10.5)

## 2023-01-17 PROCEDURE — 93015 CV STRESS TEST SUPVJ I&R: CPT

## 2023-01-17 PROCEDURE — 99152 MOD SED SAME PHYS/QHP 5/>YRS: CPT

## 2023-01-17 PROCEDURE — 92928 PRQ TCAT PLMT NTRAC ST 1 LES: CPT | Mod: LD

## 2023-01-17 PROCEDURE — 93458 L HRT ARTERY/VENTRICLE ANGIO: CPT | Mod: 26,59

## 2023-01-17 PROCEDURE — 93306 TTE W/DOPPLER COMPLETE: CPT | Mod: 26

## 2023-01-17 PROCEDURE — 99232 SBSQ HOSP IP/OBS MODERATE 35: CPT

## 2023-01-17 PROCEDURE — 93010 ELECTROCARDIOGRAM REPORT: CPT

## 2023-01-17 PROCEDURE — 99239 HOSP IP/OBS DSCHRG MGMT >30: CPT

## 2023-01-17 RX ORDER — ASPIRIN/CALCIUM CARB/MAGNESIUM 324 MG
81 TABLET ORAL DAILY
Refills: 0 | Status: DISCONTINUED | OUTPATIENT
Start: 2023-01-17 | End: 2023-01-17

## 2023-01-17 RX ORDER — SODIUM CHLORIDE 9 MG/ML
250 INJECTION INTRAMUSCULAR; INTRAVENOUS; SUBCUTANEOUS ONCE
Refills: 0 | Status: COMPLETED | OUTPATIENT
Start: 2023-01-17 | End: 2023-01-17

## 2023-01-17 RX ORDER — ASPIRIN/CALCIUM CARB/MAGNESIUM 324 MG
81 TABLET ORAL DAILY
Refills: 0 | Status: DISCONTINUED | OUTPATIENT
Start: 2023-01-17 | End: 2023-01-18

## 2023-01-17 RX ORDER — SODIUM CHLORIDE 9 MG/ML
1000 INJECTION INTRAMUSCULAR; INTRAVENOUS; SUBCUTANEOUS
Refills: 0 | Status: DISCONTINUED | OUTPATIENT
Start: 2023-01-17 | End: 2023-01-18

## 2023-01-17 RX ORDER — CLOPIDOGREL BISULFATE 75 MG/1
75 TABLET, FILM COATED ORAL DAILY
Refills: 0 | Status: DISCONTINUED | OUTPATIENT
Start: 2023-01-18 | End: 2023-01-18

## 2023-01-17 RX ORDER — ACETAMINOPHEN 500 MG
650 TABLET ORAL EVERY 6 HOURS
Refills: 0 | Status: DISCONTINUED | OUTPATIENT
Start: 2023-01-17 | End: 2023-01-18

## 2023-01-17 RX ORDER — ATORVASTATIN CALCIUM 80 MG/1
20 TABLET, FILM COATED ORAL AT BEDTIME
Refills: 0 | Status: DISCONTINUED | OUTPATIENT
Start: 2023-01-17 | End: 2023-01-18

## 2023-01-17 RX ADMIN — SODIUM CHLORIDE 750 MILLILITER(S): 9 INJECTION INTRAMUSCULAR; INTRAVENOUS; SUBCUTANEOUS at 16:32

## 2023-01-17 RX ADMIN — Medication 81 MILLIGRAM(S): at 12:40

## 2023-01-17 RX ADMIN — ATORVASTATIN CALCIUM 20 MILLIGRAM(S): 80 TABLET, FILM COATED ORAL at 22:23

## 2023-01-17 RX ADMIN — SODIUM CHLORIDE 75 MILLILITER(S): 9 INJECTION INTRAMUSCULAR; INTRAVENOUS; SUBCUTANEOUS at 16:32

## 2023-01-17 NOTE — DISCHARGE NOTE PROVIDER - NSDCMRMEDTOKEN_GEN_ALL_CORE_FT
Ecotrin Adult Low Strength 81 mg oral delayed release tablet: 4 tab(s) orally once a day ONCE ON 1/16  Ecotrin Adult Low Strength 81 mg oral delayed release tablet: 1 tab(s) orally once a day HOSP ON 1/17

## 2023-01-17 NOTE — PROGRESS NOTE ADULT - ASSESSMENT
47M preDM with new unstable angina and treadmill stress test +ST changes with high risk features.    Currently asymptomatic    # [id number]  Discussed results with pt - will transfer to [facility] for cardiac catheterization.  cont current medications
#Stable Angina  - Patient reports multiple bouts of HOLM relieved with rest  - EKG: NSR  - Troponin negative  - F/u TTE  - cont aspirin, atorvastatin  - apprec cardio consult Dr Martin  - Patient would benefit from stress test and possible outpatient catherization  - Admit to Obs for tele monitoring  -check trop and repeat ekg today    Code: Full

## 2023-01-17 NOTE — H&P CARDIOLOGY - HISTORY OF PRESENT ILLNESS
46 yo M presents with chest pain on exertion since friday. Patient has had multiple bouts of chest pain since friday and wanted to get assessed for his chest pain. Reports pain in substernal, pressure like and radiates to L arm at times. Chest pain is not associated with diaphoresis, nausea, or SOB and is always relieved with rest. Father passed away due to MI at 68. Denies smoking or any medical history. At present patient is asymptomatic at rest in bed. Pt does not have cardiologist.   46 yo M presents with chest pain on exertion since friday. Patient has had multiple bouts of chest pain since friday and wanted to get assessed for his chest pain. Reports pain in substernal, pressure like and radiates to L arm at times. Chest pain is not associated with diaphoresis, nausea, or SOB and is always relieved with rest. Father passed away due to MI at 68. Denies smoking or any medical history. At present patient is asymptomatic at rest in bed. Pt does not have cardiologist.    Patient with positive treadmill stress test with poor exercise tolerance (prior to this he played soccer regularly) and ischemic ST changes with exercise despite not being able to reach 85% MPHR 46 yo M presents with chest pain on exertion since friday. Patient has had multiple bouts of chest pain since friday and wanted to get assessed for his chest pain. Reports pain in substernal, pressure like and radiates to L arm at times. Chest pain is not associated with diaphoresis, nausea, or SOB and is always relieved with rest. Father passed away due to MI at 68. Denies smoking or any medical history. At present patient is asymptomatic at rest in bed. Pt does not have cardiologist.    Patient with positive treadmill in [facility] 1/16/23 stress test with poor exercise tolerance (prior to this he played soccer regularly) and ischemic ST changes with exercise despite not being able to reach 85% MPHR. Pt is transferred to [facility] for left heart cath.    Cards: Dr Kulwant Martin 48 yo M presents with chest pain on exertion since friday. Patient has had multiple bouts of chest pain since friday and wanted to get assessed for his chest pain. Reports pain in substernal, pressure like and radiates to L arm at times. Chest pain is not associated with diaphoresis, nausea, or SOB and is always relieved with rest. Father passed away due to MI at 68. Denies smoking or any medical history. Patient with positive treadmill ST in [facility] 1/17/23   up to a 1mm ST elevation in aVL starting at 2:50 exercise and 0.5mm ST elevation in lead I, There is up to a 1mm ST depression in leads II, III most prominent 2:50 in recovery.All ECG changes resolved by 5:00 recovery.mpression: Abnormal exercise treadmill stress test.Patient unable to reach 85% MPHR due to severe HOLM  1/16/23 TTE: EF 55-60%, Grade I diastolic dysfxn, LV concentric remodeling, mild pulm HTN. Pt is transferred to [facility] for left heart cath today.     Cards: Dr Kulwant Martin 46 yo M presents with chest pain on exertion since friday. Patient has had multiple bouts of chest pain since friday and wanted to get assessed for his chest pain. Reports pain in substernal, pressure like and radiates to L arm at times. Chest pain is not associated with diaphoresis, nausea, or SOB and is always relieved with rest. Father passed away due to MI at 68. Denies smoking or any medical history. Patient with positive treadmill ST in [facility] 1/17/23   up to a 1mm ST elevation in aVL starting at 2:50 exercise and 0.5mm ST elevation in lead I, There is up to a 1mm ST depression in leads II, III most prominent 2:50 in recovery.All ECG changes resolved by 5:00 recovery.mpression: Abnormal exercise treadmill stress test.Patient unable to reach 85% MPHR due to severe HOLM  1/16/23 TTE: EF 55-60%, Grade I diastolic dysfxn, LV concentric remodeling, mild pulm HTN. Pt is transferred to [facility] for left heart cath today.     Intepreter # [id number]    Cards: Dr Kulwant Martin

## 2023-01-17 NOTE — PROGRESS NOTE ADULT - SUBJECTIVE AND OBJECTIVE BOX
Patient is a 47y old  Male who presents with a chief complaint of Stable Angina (15 All 2023 23:07)      INTERVAL HPI: Pt seen and examined. States his chest pain is now resolved, denies any acute complaints at this time.     OVERNIGHT EVENTS: none ntoed  T(F): 98.1 (01-16-23 @ 07:53), Max: 98.7 (01-15-23 @ 17:30)  HR: 57 (01-16-23 @ 07:53) (57 - 75)  BP: 124/80 (01-16-23 @ 07:53) (124/80 - 164/103)  RR: 13 (01-16-23 @ 07:53) (13 - 18)  SpO2: 94% (01-16-23 @ 07:53) (94% - 97%)  I&O's Summary        PHYSICAL EXAM:  CONSTITUTIONAL: Well groomed, no apparent distress  EYES: PERRLA and symmetric, EOMI, No conjunctival or scleral injection, non-icteric  ENMT: Oral mucosa with moist membranes. Normal dentition; no pharyngeal injection or exudates  NECK: Supple, symmetric and without tracheal deviation   RESP: No respiratory distress, no use of accessory muscles; CTA b/l, no WRR  CV: RRR, +S1S2, no MRG; no JVD; no peripheral edema  GI: Soft, NT, ND, no rebound, no guarding  MSK:Normal ROM without pain, no spinal tenderness, normal muscle strength/tone  SKIN: No rashes or ulcers noted; no subcutaneous nodules or induration palpable  NEURO: CN II-XII intact; sensation intact in upper and lower extremities b/l to light touch   PSYCH: Appropriate insight/judgment; A+O x 3, mood and affect appropriate, recent/remote memory intact    LABS:                        15.0   8.81  )-----------( 323      ( 16 Jan 2023 07:40 )             44.9     01-16    140  |  109<H>  |  20  ----------------------------<  101<H>  3.7   |  23  |  0.98    Ca    9.0      16 Jan 2023 07:40    TPro  7.5  /  Alb  3.8  /  TBili  0.6  /  DBili  x   /  AST  21  /  ALT  46  /  AlkPhos  84  01-16    PT/INR - ( 15 All 2023 18:49 )   PT: 12.0 sec;   INR: 1.00 ratio         PTT - ( 15 All 2023 18:49 )  PTT:35.9 sec    CAPILLARY BLOOD GLUCOSE                  MEDICATIONS  (STANDING):  atorvastatin 80 milliGRAM(s) Oral at bedtime  tamsulosin 0.4 milliGRAM(s) Oral at bedtime    MEDICATIONS  (PRN):  melatonin 3 milliGRAM(s) Oral at bedtime PRN Insomnia      
Cardiology Progress Note    Interval Events:  No chest pain overnight    Patient with positive treadmill stress test with poor exercise tolerance (prior to this he played soccer regularly) and ischemic ST changes with exercise despite not being able to reach 85% MPHR      MEDICATIONS:  aspirin enteric coated 81 milliGRAM(s) Oral daily  melatonin 3 milliGRAM(s) Oral at bedtime PRN  atorvastatin 80 milliGRAM(s) Oral at bedtime  tamsulosin 0.4 milliGRAM(s) Oral at bedtime    PHYSICAL EXAM:  T(C): 36.4 (01-17-23 @ 04:56), Max: 36.8 (01-16-23 @ 19:45)  HR: 67 (01-17-23 @ 04:56) (67 - 76)  BP: 141/83 (01-17-23 @ 04:56) (138/86 - 141/93)  RR: 19 (01-17-23 @ 04:56) (15 - 20)  SpO2: 95% (01-17-23 @ 04:56) (93% - 96%)  Wt(kg): --  I&O's Summary    Appearance: No acute distress  HEENT:   Normal oral mucosa, PERRL  Cardiovascular: Normal S1 S2, no elevated JVP, no murmurs, no edema  Respiratory: Lungs clear to auscultation, good air movement  Psychiatry: A & O x 3, Mood & affect appropriate  Gastrointestinal:  soft nt  Skin: No rashes, no ecchymoses, no cyanosis	  Neurologic: grossly non-focal  Extremities: Normal range of motion, no clubbing, cyanosis or edema  Vascular: Peripheral pulses palpable bilaterally    LABS:	 	  CBC Full  -  ( 17 Jan 2023 07:42 )  WBC Count : 9.42 K/uL  Hemoglobin : 15.6 g/dL  Hematocrit : 46.0 %  Platelet Count - Automated : 361 K/uL      01-17  139  |  107  |  20  ----------------------------<  107<H>  3.6   |  23  |  1.06    01-16  140  |  109<H>  |  20  ----------------------------<  101<H>  3.7   |  23  |  0.98    Ca    9.2      17 Jan 2023 07:42  Ca    9.0      16 Jan 2023 07:40    TPro  7.8  /  Alb  4.0  /  TBili  0.6  /  DBili  x   /  AST  25  /  ALT  49  /  AlkPhos  90  01-17  TPro  7.5  /  Alb  3.8  /  TBili  0.6  /  DBili  x   /  AST  21  /  ALT  46  /  AlkPhos  84  01-16    CARDIAC MARKERS:  Troponin I, High Sensitivity Result: 9.4 ng/L (01-16-23 @ 14:30)  Troponin I, High Sensitivity Result: 17.1 ng/L (01-15-23 @ 18:49)

## 2023-01-18 ENCOUNTER — TRANSCRIPTION ENCOUNTER (OUTPATIENT)
Age: 48
End: 2023-01-18

## 2023-01-18 VITALS
HEART RATE: 72 BPM | DIASTOLIC BLOOD PRESSURE: 75 MMHG | SYSTOLIC BLOOD PRESSURE: 105 MMHG | RESPIRATION RATE: 16 BRPM | OXYGEN SATURATION: 96 % | TEMPERATURE: 98 F

## 2023-01-18 PROCEDURE — C9600: CPT | Mod: LD

## 2023-01-18 PROCEDURE — C1874: CPT

## 2023-01-18 PROCEDURE — 93458 L HRT ARTERY/VENTRICLE ANGIO: CPT | Mod: 59

## 2023-01-18 PROCEDURE — C1894: CPT

## 2023-01-18 PROCEDURE — C1725: CPT

## 2023-01-18 PROCEDURE — C1769: CPT

## 2023-01-18 PROCEDURE — C1887: CPT

## 2023-01-18 RX ORDER — METOPROLOL TARTRATE 50 MG
25 TABLET ORAL DAILY
Refills: 0 | Status: DISCONTINUED | OUTPATIENT
Start: 2023-01-18 | End: 2023-01-18

## 2023-01-18 RX ORDER — METOPROLOL TARTRATE 50 MG
1 TABLET ORAL
Qty: 30 | Refills: 0
Start: 2023-01-18 | End: 2023-02-16

## 2023-01-18 RX ORDER — ASPIRIN/CALCIUM CARB/MAGNESIUM 324 MG
1 TABLET ORAL
Qty: 0 | Refills: 0 | DISCHARGE
Start: 2023-01-18

## 2023-01-18 RX ORDER — CLOPIDOGREL BISULFATE 75 MG/1
1 TABLET, FILM COATED ORAL
Qty: 90 | Refills: 3
Start: 2023-01-18 | End: 2024-01-12

## 2023-01-18 RX ORDER — ACETAMINOPHEN 500 MG
2 TABLET ORAL
Qty: 0 | Refills: 0 | DISCHARGE
Start: 2023-01-18

## 2023-01-18 RX ORDER — ASPIRIN/CALCIUM CARB/MAGNESIUM 324 MG
4 TABLET ORAL
Qty: 0 | Refills: 0 | DISCHARGE

## 2023-01-18 RX ORDER — ATORVASTATIN CALCIUM 80 MG/1
1 TABLET, FILM COATED ORAL
Qty: 30 | Refills: 0
Start: 2023-01-18 | End: 2023-02-16

## 2023-01-18 RX ORDER — ASPIRIN/CALCIUM CARB/MAGNESIUM 324 MG
1 TABLET ORAL
Qty: 0 | Refills: 0 | DISCHARGE

## 2023-01-18 RX ADMIN — Medication 25 MILLIGRAM(S): at 05:55

## 2023-01-18 RX ADMIN — CLOPIDOGREL BISULFATE 75 MILLIGRAM(S): 75 TABLET, FILM COATED ORAL at 05:55

## 2023-01-18 RX ADMIN — Medication 81 MILLIGRAM(S): at 05:56

## 2023-01-18 NOTE — DISCHARGE NOTE PROVIDER - NSDCPNSUBOBJ_GEN_ALL_CORE
Patient is a 46 y/o male who presents with a chief complaint of abnormal stress test        Allergies    No Known Allergies    Intolerances        Medications:  acetaminophen     Tablet .. 650 milliGRAM(s) Oral every 6 hours PRN  aspirin enteric coated 81 milliGRAM(s) Oral daily  atorvastatin 20 milliGRAM(s) Oral at bedtime  clopidogrel Tablet 75 milliGRAM(s) Oral daily  sodium chloride 0.9%. 1000 milliLiter(s) IV Continuous <Continuous>      Vitals:  T(C): 36.7 (23 @ 04:20), Max: 36.9 (23 @ 13:43)  HR: 71 (23 @ 04:20) (70 - 90)  BP: 127/75 (23 @ 04:20) (125/87 - 154/82)  BP(mean): 87 (23 @ 04:20) (85 - 108)  RR: 16 (23 @ 04:20) (16 - 18)  SpO2: 96% (23 @ 04:20) (91% - 97%)  Wt(kg): --  Daily Height in cm: 180.34 (2023 13:43)    Daily Weight in k.1 (2023 13:43)  I&O's Summary    2023 07:01  -  2023 05:22  --------------------------------------------------------  IN: 240 mL / OUT: 300 mL / NET: -60 mL          Physical Exam:  Appearance: Normal  Eyes: PERRL, EOMI  HENT: Normal oral muscosa, NC/AT  Cardiovascular: S1S2, RRR, No M/R/G, no JVD, No Lower extremity edema  Procedural Access Site: No hematoma, Non-tender to palpation, 2+ pulse, No bruit, No Ecchymosis  Respiratory: Clear to auscultation bilaterally  Gastrointestinal: Soft, Non tender, Normal Bowel Sounds  Musculoskeletal: No clubbing, No joint deformity   Neurologic: Non-focal  Lymphatic: No lymphadenopathy  Psychiatry: AAOx3, Mood & affect appropriate  Skin: No rashes, No ecchymoses, No cyanosis        139  |  107  |  20  ----------------------------<  107<H>  3.6   |  23  |  1.06    Ca    9.2      2023 07:42    TPro  7.8  /  Alb  4.0  /  TBili  0.6  /  DBili  x   /  AST  25  /  ALT  49  /  AlkPhos  90              Lipid panel   Hgb A1c                         15.6   9.42  )-----------( 361      ( 2023 07:42 )             46.0         ECG: SR 69 bpm    CAD: Monitor radial site for swelling, bleeding  Continue ASA, Plavix    HTN: Continue antihypertensive medications with hold parameters     HLD: continue statin, confirm lipid panel results     Imaging:    Interpretation of Telemetry:

## 2023-01-18 NOTE — DISCHARGE NOTE PROVIDER - HOSPITAL COURSE
HPI:  46 yo M presents with chest pain on exertion since friday. Patient has had multiple bouts of chest pain since friday and wanted to get assessed for his chest pain. Reports pain in substernal, pressure like and radiates to L arm at times. Chest pain is not associated with diaphoresis, nausea, or SOB and is always relieved with rest. Father passed away due to MI at 68. Denies smoking or any medical history. Patient with positive treadmill ST in [facility] 1/17/23   up to a 1mm ST elevation in aVL starting at 2:50 exercise and 0.5mm ST elevation in lead I, There is up to a 1mm ST depression in leads II, III most prominent 2:50 in recovery.All ECG changes resolved by 5:00 recovery.mpression: Abnormal exercise treadmill stress test.Patient unable to reach 85% MPHR due to severe HOLM  1/16/23 TTE: EF 55-60%, Grade I diastolic dysfxn, LV concentric remodeling, mild pulm HTN. Pt is transferred to [facility] for left heart cath today.     Intepreter # [id number]    Cards: Dr Kulwant Martin (17 Jan 2023 13:43)    1/17 cardiac cath with one stent to the mid LAD. Right radial cath site without swelling, bleeding.

## 2023-01-18 NOTE — DISCHARGE NOTE NURSING/CASE MANAGEMENT/SOCIAL WORK - NSDCPEFALRISK_GEN_ALL_CORE
Home Care Instructions                                                                                                                Hunter Costelloyu Klein Jr.   MRN: [id number]    Department:  [facility], Emergency Dept   Date of Visit:  4/9/2017            [facility], Emergency Dept    [address] [address]    Phone:  [phone]      You were seen by     Mc Byers M.D.      Your Diagnosis Was     Acute febrile illness in child     R50.9       These are the medications you received during your hospitalization from 04/09/2017 0936 to 04/09/2017 1054     Date/Time Order Dose Route Action    04/09/2017 0959 ibuprofen (MOTRIN) oral suspension 146 mg 146 mg Oral Given      Follow-up Information     1. Follow up with Chuy Anne M.D. In 1 day.    Specialty:  Pediatrics    Why:  As needed, If symptoms worsen    Contact information    [address]  [address]  [facility] [address] [phone]        Medication Information     Review all of your home medications and newly ordered medications with your primary doctor and/or pharmacist as soon as possible. Follow medication instructions as directed by your doctor and/or pharmacist.     Please keep your complete medication list with you and share with your physician. Update the information when medications are discontinued, doses are changed, or new medications (including over-the-counter products) are added; and carry medication information at all times in the event of emergency situations.               Medication List      START taking these medications        Instructions    Morning Afternoon Evening Bedtime    amoxicillin 400 MG/5ML suspension   Commonly known as:  AMOXIL        Take 8.2 mL by mouth 2 times a day for 7 days.   Dose:  90 mg/kg/day                          ASK your doctor about these medications        Instructions    Morning Afternoon Evening Bedtime    ibuprofen 100 MG/5ML Susp   Last  "time this was given:  146 mg on 4/9/2017  9:59 AM   Commonly known as:  MOTRIN        Take 10 mg/kg by mouth every 6 hours as needed.   Dose:  10 mg/kg                        TYLENOL PO        Take  by mouth. Indications: Mom gave 1.25 mL of the 160 mg per 5 mL tylenol                             Where to Get Your Medications      You can get these medications from any pharmacy     Bring a paper prescription for each of these medications    - amoxicillin 400 MG/5ML suspension            Procedures and tests performed during your visit     BETA STREP SCREEN (GP. A)    RAPID STREP, CULT IF INDICATED (CULTURE IF NEGATIVE)        Discharge Instructions       Fever   Yourchild can take 140 mg of ibuprofen every 6 hours 180 mg of Tylenol every 6 hours  Fever is a higher-than-normal body temperature. A normal temperature varies with:  · Age.   · How it is measured (mouth, underarm, rectal, or ear).   · Time of day.   In an adult, an oral temperature around 98.6° Fahrenheit (F) or 37° Celsius (C) is considered normal. A rise in temperature of about 1.8° F or 1° C is generally considered a fever (100.4° F or 38° C). In an infant age 28 days or less, a rectal temperature of 100.4° F (38° C) generally is regarded as fever. Fever is not a disease but can be a symptom of illness.  CAUSES   · Fever is most commonly caused by infection.   · Some non-infectious problems can cause fever. For example:   · Some arthritis problems.   · Problems with the thyroid or adrenal glands.   · Immune system problems.   · Some kinds of cancer.   · A reaction to certain medicines.   · Occasionally, the source of a fever cannot be determined. This is sometimes called a \"Fever of Unknown Origin\" (FUO).   · Some situations may lead to a temporary rise in body temperature that may go away on its own. Examples are:   · Childbirth.   · Surgery.   · Some situations may cause a rise in body temperature but these are not considered \"true fever\". Examples " are:   · Intense exercise.   · Dehydration.   · Exposure to high outside or room temperatures.   SYMPTOMS   · Feeling warm or hot.   · Fatigue or feeling exhausted.   · Aching all over.   · Chills.   · Shivering.   · Sweats.   DIAGNOSIS   A fever can be suspected by your caregiver feeling that your skin is unusually warm. The fever is confirmed by taking a temperature with a thermometer. Temperatures can be taken different ways. Some methods are accurate and some are not:  With adults, adolescents, and children:   · An oral temperature is used most commonly.   · An ear thermometer will only be accurate if it is positioned as recommended by the .   · Under the arm temperatures are not accurate and not recommended.   · Most electronic thermometers are fast and accurate.   Infants and Toddlers:  · Rectal temperatures are recommended and most accurate.   · Ear temperatures are not accurate in this age group and are not recommended.   · Skin thermometers are not accurate.   RISKS AND COMPLICATIONS   · During a fever, the body uses more oxygen, so a person with a fever may develop rapid breathing or shortness of breath. This can be dangerous especially in people with heart or lung disease.   · The sweats that occur following a fever can cause dehydration.   · High fever can cause seizures in infants and children.   · Older persons can develop confusion during a fever.   TREATMENT   · Medications may be used to control temperature.   · Do not give aspirin to children with fevers. There is an association with Reye's syndrome. Reye's syndrome is a rare but potentially deadly disease.   · If an infection is present and medications have been prescribed, take them as directed. Finish the full course of medications until they are gone.   · Sponging or bathing with room-temperature water may help reduce body temperature. Do not use ice water or alcohol sponge baths.   · Do not over-bundle children in blankets or heavy  clothes.   · Drinking adequate fluids during an illness with fever is important to prevent dehydration.   HOME CARE INSTRUCTIONS   · For adults, rest and adequate fluid intake are important. Dress according to how you feel, but do not over-bundle.   · Drink enough water and/or fluids to keep your urine clear or pale yellow.   · For infants over 3 months and children, giving medication as directed by your caregiver to control fever can help with comfort. The amount to be given is based on the child's weight. Do NOT give more than is recommended.   SEEK MEDICAL CARE IF:   · You or your child are unable to keep fluids down.   · Vomiting or diarrhea develops.   · You develop a skin rash.   · An oral temperature above 102° F (38.9° C) develops, or a fever which persists for over 3 days.   · You develop excessive weakness, dizziness, fainting or extreme thirst.   · Fevers keep coming back after 3 days.   SEEK IMMEDIATE MEDICAL CARE IF:   · Shortness of breath or trouble breathing develops   · You pass out.   · You feel you are making little or no urine.   · New pain develops that was not there before (such as in the head, neck, chest, back, or abdomen).   · You cannot hold down fluids.   · Vomiting and diarrhea persist for more than a day or two.   · You develop a stiff neck and/or your eyes become sensitive to light.   · An unexplained temperature above 102° F (38.9° C) develops.   Document Released: 12/18/2006 Document Revised: 03/11/2013 Document Reviewed: 12/03/2009  ExitCare® Patient Information ©2013 [employer].Otitis Media With Effusion  Otitis media with effusion is the presence of fluid in the middle ear. This is a common problem in children, which often follows ear infections. It may be present for weeks or longer after the infection. Unlike an acute ear infection, otitis media with effusion refers only to fluid behind the ear drum and not infection. Children with repeated ear and sinus infections and allergy  "problems are the most likely to get otitis media with effusion.  CAUSES   The most frequent cause of the fluid buildup is dysfunction of the eustachian tubes. These are the tubes that drain fluid in the ears to the back of the nose (nasopharynx).  SYMPTOMS   · The main symptom of this condition is hearing loss. As a result, you or your child may:  ¨ Listen to the TV at a loud volume.  ¨ Not respond to questions.  ¨ Ask \"what\" often when spoken to.  ¨ Mistake or confuse one sound or word for another.  · There may be a sensation of fullness or pressure but usually not pain.  DIAGNOSIS   · Your health care provider will diagnose this condition by examining you or your child's ears.  · Your health care provider may test the pressure in you or your child's ear with a tympanometer.  · A hearing test may be conducted if the problem persists.  TREATMENT   · Treatment depends on the duration and the effects of the effusion.  · Antibiotics, decongestants, nose drops, and cortisone-type drugs (tablets or nasal spray) may not be helpful.  · Children with persistent ear effusions may have delayed language or behavioral problems. Children at risk for developmental delays in hearing, learning, and speech may require referral to a specialist earlier than children not at risk.  · You or your child's health care provider may suggest a referral to an ear, nose, and throat surgeon for treatment. The following may help restore normal hearing:  ¨ Drainage of fluid.  ¨ Placement of ear tubes (tympanostomy tubes).  ¨ Removal of adenoids (adenoidectomy).  HOME CARE INSTRUCTIONS   · Avoid secondhand smoke.  · Infants who are  are less likely to have this condition.  · Avoid feeding infants while they are lying flat.  · Avoid known environmental allergens.  · Avoid people who are sick.  SEEK MEDICAL CARE IF:   · Hearing is not better in 3 months.  · Hearing is worse.  · Ear pain.  · Drainage from the ear.  · Dizziness.  MAKE SURE " YOU:   · Understand these instructions.  · Will watch your condition.  · Will get help right away if you are not doing well or get worse.     This information is not intended to replace advice given to you by your health care provider. Make sure you discuss any questions you have with your health care provider.     Document Released: 01/25/2006 Document Revised: 01/08/2016 Document Reviewed: 07/15/2014  [employer] Interactive Patient Education ©2016 [employer]            Patient Information     Patient Information    Following emergency treatment: all patient requiring follow-up care must return either to a private physician or a clinic if your condition worsens before you are able to obtain further medical attention, please return to the emergency room.     Billing Information    At [facility], we work to make the billing process streamlined for our patients.  Our Representatives are here to answer any questions you may have regarding your hospital bill.  If you have insurance coverage and have supplied your insurance information to us, we will submit a claim to your insurer on your behalf.  Should you have any questions regarding your bill, we can be reached online or by phone as follows:  Online: You are able pay your bills online or live chat with our representatives about any billing questions you may have. We are here to help Monday - Friday from 8:00am to 7:30pm and 9:00am - 12:00pm on Saturdays.  Please visit [email or web address]  for more information.   Phone:  [phone] or [phone]    Please note that your emergency physician, surgeon, pathologist, radiologist, anesthesiologist, and other specialists are not employed by [facility] and will therefore bill separately for their services.  Please contact them directly for any questions concerning their bills at the numbers below:     Emergency Physician Services:  [phone]  [facility]:   [phone]  [facility]:  [phone]  [facility]:  [phone]    1. Your final bill may vary from the amount quoted upon discharge if all procedures are not complete at that time, or if your doctor has additional procedures of which we are not aware. You will receive an additional bill if you return to the Emergency Department at [facility] for suture removal regardless of the facility of which the sutures were placed.     2. Please arrange for settlement of this account at the emergency registration.    3. All self-pay accounts are due in full at the time of treatment.  If you are unable to meet this obligation then payment is expected within 4-5 days.     4. If you have had radiology studies (CT, X-ray, Ultrasound, MRI), you have received a preliminary result during your emergency department visit. Please contact the radiology department [phone] to receive a copy of your final result. Please discuss the Final result with your primary physician or with the follow up physician provided.     Crisis Hotline:  [place] Crisis Hotline:  3-445-WNSCNVR or 1-112.674.1596  [place] Crisis Hotline:    1-262.237.8100 or 697-677-2984         ED Discharge Follow Up Questions    1. In order to provide you with very good care, we would like to follow up with a phone call in the next few days.  May we have your permission to contact you?     YES /  NO    2. What is the best phone number to call you? (       )_____-__________    3. What is the best time to call you?      Morning  /  Afternoon  /  Evening                   Patient Signature:  ____________________________________________________________    Date:  ____________________________________________________________                For information on Fall & Injury Prevention, visit: [email or web address] OR  [email or web address] OR  https://www.cdc.gov/steadi/patient.html

## 2023-01-18 NOTE — DISCHARGE NOTE PROVIDER - NSDCCPCAREPLAN_GEN_ALL_CORE_FT
PRINCIPAL DISCHARGE DIAGNOSIS  Diagnosis: CAD (coronary artery disease)  Assessment and Plan of Treatment: Do not stop your aspirin or Plavix unless instructed to do so by your cardiologist, they help keep your stented arteries open.   No heavy lifting or pushing/pulling with procedure arm for 2 weeks. No driving for 2 days. You may shower 24 hours following the procedure but avoid baths/swimming for 1 week. Check your wrist site for bleeding and/or swelling daily following procedure and call your doctor immediately if it occurs or if you experience increased pain at the site. Follow up with your cardiologist in 1-2 weeks. You may call [facility] if you have any questions/concerns regarding your procedure [phone].      SECONDARY DISCHARGE DIAGNOSES  Diagnosis: HTN (hypertension)  Assessment and Plan of Treatment: Continue with your blood pressure medications; eat a heart healthy diet with low salt diet; exercise regularly (consult with your physician or cardiologist first); maintain a heart healthy weight; if you smoke - quit (A resource to help you stop smoking is the [facility] eCurv for [employer] Control – phone number 589-440-1836.); include healthy ways to manage stress. Continue to follow with your primary care physician or cardiologist.    Diagnosis: HLD (hyperlipidemia)  Assessment and Plan of Treatment: Continue with your cholesterol medications. Eat a heart healthy diet that is low in saturated fats and salt, and includes whole grains, fruits, vegetables and lean protein; exercise regularly (consult with your physician or cardiologist first); maintain a heart healthy weight; if you smoke - quit (A resource to help you stop smoking is the [facility] eCurv for Tobacco Control – phone number [phone].). Continue to follow with your primary physician or cardiologist.

## 2023-01-18 NOTE — DISCHARGE NOTE NURSING/CASE MANAGEMENT/SOCIAL WORK - PATIENT PORTAL LINK FT
You can access the FollowMyHealth Patient Portal offered by [facility] by registering at the following website: [email or web address] By joining [employer]s FollowMyHealth portal, you will also be able to view your health information using other applications (apps) compatible with our system.

## 2023-01-18 NOTE — DISCHARGE NOTE PROVIDER - NSDCMRMEDTOKEN_GEN_ALL_CORE_FT
acetaminophen 325 mg oral tablet: 2 tab(s) orally every 6 hours, As needed, Mild Pain (1 - 3)  Aspirin Enteric Coated 81 mg oral delayed release tablet: 1 tab(s) orally once a day  atorvastatin 20 mg oral tablet: 1 tab(s) orally once a day (at bedtime)  clopidogrel 75 mg oral tablet: 1 tab(s) orally once a day   acetaminophen 325 mg oral tablet: 2 tab(s) orally every 6 hours, As needed, Mild Pain (1 - 3)  Aspirin Enteric Coated 81 mg oral delayed release tablet: 1 tab(s) orally once a day  atorvastatin 20 mg oral tablet: 1 tab(s) orally once a day (at bedtime)  clopidogrel 75 mg oral tablet: 1 tab(s) orally once a day  metoprolol succinate 25 mg oral tablet, extended release: 1 tab(s) orally once a day

## 2023-01-18 NOTE — DISCHARGE NOTE PROVIDER - CARE PROVIDER_API CALL
Kulwant Martin (MD)  Cardiovascular Disease; Internal Medicine  [address] [place] [address]  Phone: [phone]  Fax: [phone]  Follow Up Time: 2 weeks

## 2023-01-18 NOTE — DISCHARGE NOTE NURSING/CASE MANAGEMENT/SOCIAL WORK - NSFLUVACAGEDISCH_IMM_ALL_CORE
Problem: Patient Care Overview  Goal: Plan of Care Review  Flowsheets (Taken 9/9/2020 3424)  Plan of Care Reviewed With: patient  Outcome Summary: 45 yo female w/ multiple chronic illnesses admitted for onset of seizure activity, as well as pna. Hx of R frontal craniotomy, esrd MWF. Pt able to come to sitting w/ minimal assistance. Comes to standing and transfers to chair w/ min assist. She is unsteady in standing and has poor sequencing for movement. She is also impulsive and easily distracted, so she needs frequent redirection to task. Initially BP was quite high, but RN adjusted cardine drip, and BP was in workable range and normal by end of rx. Had large drop in BP after sitting in chair, but this recovered well w/ simple elevation of her feet. Pt not stable w/ ambulation, and she will need close monitoring of vital signs w/ movement. Recommend IP rehab at d/c.  PPE: gloves, mask, goggles.       Adult

## 2023-01-19 DIAGNOSIS — R73.03 PREDIABETES: ICD-10-CM

## 2023-01-19 DIAGNOSIS — I25.118 ATHEROSCLEROTIC HEART DISEASE OF NATIVE CORONARY ARTERY WITH OTHER FORMS OF ANGINA PECTORIS: ICD-10-CM

## 2023-01-19 DIAGNOSIS — Z87.891 PERSONAL HISTORY OF NICOTINE DEPENDENCE: ICD-10-CM

## 2023-02-09 ENCOUNTER — APPOINTMENT (OUTPATIENT)
Dept: CARDIOLOGY | Facility: CLINIC | Age: 48
End: 2023-02-09
Payer: SELF-PAY

## 2023-02-09 ENCOUNTER — NON-APPOINTMENT (OUTPATIENT)
Age: 48
End: 2023-02-09

## 2023-02-09 VITALS
SYSTOLIC BLOOD PRESSURE: 147 MMHG | DIASTOLIC BLOOD PRESSURE: 89 MMHG | WEIGHT: 250 LBS | BODY MASS INDEX: 35 KG/M2 | OXYGEN SATURATION: 98 % | HEART RATE: 59 BPM | TEMPERATURE: 98.3 F | HEIGHT: 71 IN

## 2023-02-09 DIAGNOSIS — Z82.49 FAMILY HISTORY OF ISCHEMIC HEART DISEASE AND OTHER DISEASES OF THE CIRCULATORY SYSTEM: ICD-10-CM

## 2023-02-09 DIAGNOSIS — Z98.61 ATHEROSCLEROTIC HEART DISEASE OF NATIVE CORONARY ARTERY W/OUT ANGINA PECTORIS: ICD-10-CM

## 2023-02-09 DIAGNOSIS — R73.03 PREDIABETES.: ICD-10-CM

## 2023-02-09 DIAGNOSIS — E78.5 HYPERLIPIDEMIA, UNSPECIFIED: ICD-10-CM

## 2023-02-09 DIAGNOSIS — Z87.891 PERSONAL HISTORY OF NICOTINE DEPENDENCE: ICD-10-CM

## 2023-02-09 DIAGNOSIS — I25.10 ATHEROSCLEROTIC HEART DISEASE OF NATIVE CORONARY ARTERY W/OUT ANGINA PECTORIS: ICD-10-CM

## 2023-02-09 PROCEDURE — 99204 OFFICE O/P NEW MOD 45 MIN: CPT

## 2023-02-09 RX ORDER — ASPIRIN ENTERIC COATED TABLETS 81 MG 81 MG/1
81 TABLET, DELAYED RELEASE ORAL DAILY
Qty: 90 | Refills: 3 | Status: ACTIVE | COMMUNITY
Start: 2023-02-09

## 2023-02-09 NOTE — CARDIOLOGY SUMMARY
[de-identified] : 1/17/23: DOUGLAS [de-identified] : 1/16/23: Stress test - treadmill with 1mm ST depressions in lateral leads and 0.5mm ST elevation in lead I. Unable to reach 85% MPHR due to HOLM [de-identified] : 1/16/23 TTE: EF 55-60%, Grade I diastolic dysfxn, LV concentric remodeling, \par mild pulm HTN [de-identified] : 1/17/23: stent to 99% mLAD\par

## 2023-02-09 NOTE — DISCUSSION/SUMMARY
[FreeTextEntry1] : premature CAD s/p recent stent\par \par -continue asa 81mg daily indefinitely\par -continue clopidogrel for a total of one year of DAPT\par -cont smoking cessation\par -heart healthy diet and exercise discussed\par -cont atorvastatin - increased dose to 40mg daily\par -continue metoprolol succinate 25mg daily\par \par given lack of insurance he has an appointment at once of the hospital clinics for continuity of care\par refills provided for his medications

## 2024-02-14 ENCOUNTER — RX RENEWAL (OUTPATIENT)
Age: 49
End: 2024-02-14

## 2024-02-14 RX ORDER — METOPROLOL SUCCINATE 25 MG/1
25 TABLET, EXTENDED RELEASE ORAL
Qty: 90 | Refills: 3 | Status: ACTIVE | COMMUNITY
Start: 2023-02-09 | End: 1900-01-01

## 2024-02-15 ENCOUNTER — RX RENEWAL (OUTPATIENT)
Age: 49
End: 2024-02-15

## 2024-02-15 RX ORDER — CLOPIDOGREL BISULFATE 75 MG/1
75 TABLET, FILM COATED ORAL DAILY
Qty: 90 | Refills: 3 | Status: DISCONTINUED | COMMUNITY
Start: 2023-02-09 | End: 2024-02-15

## 2024-06-25 ENCOUNTER — RX RENEWAL (OUTPATIENT)
Age: 49
End: 2024-06-25

## 2024-06-25 RX ORDER — ATORVASTATIN CALCIUM 40 MG/1
40 TABLET, FILM COATED ORAL DAILY
Qty: 90 | Refills: 0 | Status: ACTIVE | COMMUNITY
Start: 2023-02-09 | End: 1900-01-01

## 2025-01-07 ENCOUNTER — TRANSCRIPTION ENCOUNTER (OUTPATIENT)
Age: 50
End: 2025-01-07

## 2025-04-21 ENCOUNTER — RX RENEWAL (OUTPATIENT)
Age: 50
End: 2025-04-21